# Patient Record
Sex: MALE | Race: WHITE | URBAN - METROPOLITAN AREA
[De-identification: names, ages, dates, MRNs, and addresses within clinical notes are randomized per-mention and may not be internally consistent; named-entity substitution may affect disease eponyms.]

---

## 2021-09-21 ENCOUNTER — HOSPITAL ENCOUNTER (OUTPATIENT)
Dept: GENERAL RADIOLOGY | Age: 21
Discharge: HOME | End: 2021-09-23
Payer: COMMERCIAL

## 2021-09-21 ENCOUNTER — HOSPITAL ENCOUNTER (OUTPATIENT)
Age: 21
Discharge: HOME | End: 2021-09-23
Payer: COMMERCIAL

## 2021-09-21 DIAGNOSIS — M79.671 RIGHT FOOT PAIN: ICD-10-CM

## 2021-09-21 PROCEDURE — 73630 X-RAY EXAM OF FOOT: CPT

## 2021-09-21 PROCEDURE — 73610 X-RAY EXAM OF ANKLE: CPT

## 2022-06-26 ENCOUNTER — HOSPITAL ENCOUNTER (INPATIENT)
Age: 22
LOS: 1 days | Discharge: HOME | DRG: 084 | End: 2022-06-27
Attending: EMERGENCY MEDICINE | Admitting: SURGERY

## 2022-06-26 ENCOUNTER — APPOINTMENT (OUTPATIENT)
Dept: CT IMAGING | Age: 22
DRG: 084 | End: 2022-06-26
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
## 2022-06-26 DIAGNOSIS — T14.90XA TRAUMA: ICD-10-CM

## 2022-06-26 LAB
ABO/RH: NORMAL
ANION GAP SERPL CALCULATED.3IONS-SCNC: 9 MMOL/L (ref 9–17)
ANTIBODY SCREEN: NEGATIVE
ARM BAND NUMBER: NORMAL
BLOOD BANK SPECIMEN: ABNORMAL
BUN BLDV-MCNC: 7 MG/DL (ref 6–20)
CARBOXYHEMOGLOBIN: 5.6 % (ref 0–5)
CHLORIDE BLD-SCNC: 103 MMOL/L (ref 98–107)
CO2: 24 MMOL/L (ref 20–31)
CREAT SERPL-MCNC: 0.89 MG/DL (ref 0.7–1.2)
ETHANOL PERCENT: <0.01 %
ETHANOL: <10 MG/DL
EXPIRATION DATE: NORMAL
FIO2: ABNORMAL
GLUCOSE BLD-MCNC: 156 MG/DL (ref 70–99)
HCG QUALITATIVE: ABNORMAL
HCO3 VENOUS: 24.8 MMOL/L (ref 24–30)
HCT VFR BLD CALC: 42.7 % (ref 40.7–50.3)
HEMOGLOBIN: 14.5 G/DL (ref 13–17)
INR BLD: 1
MCH RBC QN AUTO: 30.9 PG (ref 25.2–33.5)
MCHC RBC AUTO-ENTMCNC: 34 G/DL (ref 28.4–34.8)
MCV RBC AUTO: 90.9 FL (ref 82.6–102.9)
NEGATIVE BASE EXCESS, VEN: 0.4 MMOL/L (ref 0–2)
NRBC AUTOMATED: 0 PER 100 WBC
O2 SAT, VEN: 76.8 % (ref 60–85)
PARTIAL THROMBOPLASTIN TIME: 22.3 SEC (ref 20.5–30.5)
PATIENT TEMP: 37
PCO2, VEN: 44.6 (ref 39–55)
PDW BLD-RTO: 13.6 % (ref 11.8–14.4)
PH VENOUS: 7.36 (ref 7.32–7.42)
PLATELET # BLD: 172 K/UL (ref 138–453)
PMV BLD AUTO: 10.9 FL (ref 8.1–13.5)
PO2, VEN: 39.2 (ref 30–50)
POTASSIUM SERPL-SCNC: 3.2 MMOL/L (ref 3.7–5.3)
PROTHROMBIN TIME: 10.9 SEC (ref 9.1–12.3)
RBC # BLD: 4.7 M/UL (ref 4.21–5.77)
SODIUM BLD-SCNC: 136 MMOL/L (ref 135–144)
WBC # BLD: 10.9 K/UL (ref 3.5–11.3)

## 2022-06-26 PROCEDURE — 84703 CHORIONIC GONADOTROPIN ASSAY: CPT

## 2022-06-26 PROCEDURE — 82947 ASSAY GLUCOSE BLOOD QUANT: CPT

## 2022-06-26 PROCEDURE — 86900 BLOOD TYPING SEROLOGIC ABO: CPT

## 2022-06-26 PROCEDURE — 72125 CT NECK SPINE W/O DYE: CPT

## 2022-06-26 PROCEDURE — 6360000004 HC RX CONTRAST MEDICATION: Performed by: STUDENT IN AN ORGANIZED HEALTH CARE EDUCATION/TRAINING PROGRAM

## 2022-06-26 PROCEDURE — 82565 ASSAY OF CREATININE: CPT

## 2022-06-26 PROCEDURE — 86901 BLOOD TYPING SEROLOGIC RH(D): CPT

## 2022-06-26 PROCEDURE — 80051 ELECTROLYTE PANEL: CPT

## 2022-06-26 PROCEDURE — 99285 EMERGENCY DEPT VISIT HI MDM: CPT

## 2022-06-26 PROCEDURE — 3209999900 CT LUMBAR SPINE TRAUMA RECONSTRUCTION

## 2022-06-26 PROCEDURE — 71260 CT THORAX DX C+: CPT

## 2022-06-26 PROCEDURE — 85027 COMPLETE CBC AUTOMATED: CPT

## 2022-06-26 PROCEDURE — 3209999900 CT THORACIC SPINE TRAUMA RECONSTRUCTION

## 2022-06-26 PROCEDURE — G0480 DRUG TEST DEF 1-7 CLASSES: HCPCS

## 2022-06-26 PROCEDURE — 85730 THROMBOPLASTIN TIME PARTIAL: CPT

## 2022-06-26 PROCEDURE — 2060000002 HC BURN ICU INTERMEDIATE R&B

## 2022-06-26 PROCEDURE — 70450 CT HEAD/BRAIN W/O DYE: CPT

## 2022-06-26 PROCEDURE — 93005 ELECTROCARDIOGRAM TRACING: CPT

## 2022-06-26 PROCEDURE — 86850 RBC ANTIBODY SCREEN: CPT

## 2022-06-26 PROCEDURE — 85610 PROTHROMBIN TIME: CPT

## 2022-06-26 PROCEDURE — 84520 ASSAY OF UREA NITROGEN: CPT

## 2022-06-26 PROCEDURE — 82805 BLOOD GASES W/O2 SATURATION: CPT

## 2022-06-26 PROCEDURE — 70486 CT MAXILLOFACIAL W/O DYE: CPT

## 2022-06-26 RX ORDER — FENTANYL CITRATE 50 UG/ML
INJECTION, SOLUTION INTRAMUSCULAR; INTRAVENOUS
Status: DISPENSED
Start: 2022-06-26 | End: 2022-06-27

## 2022-06-26 RX ADMIN — IOPAMIDOL 130 ML: 755 INJECTION, SOLUTION INTRAVENOUS at 18:22
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
## 2022-06-27 VITALS
TEMPERATURE: 98.6 F | OXYGEN SATURATION: 96 % | HEART RATE: 52 BPM | BODY MASS INDEX: 17.82 KG/M2 | RESPIRATION RATE: 21 BRPM | HEIGHT: 75 IN | DIASTOLIC BLOOD PRESSURE: 72 MMHG | WEIGHT: 143.3 LBS | SYSTOLIC BLOOD PRESSURE: 112 MMHG

## 2022-06-27 PROCEDURE — 2580000003 HC RX 258: Performed by: PEDIATRICS

## 2022-06-27 PROCEDURE — 92523 SPEECH SOUND LANG COMPREHEN: CPT

## 2022-06-27 PROCEDURE — 6370000000 HC RX 637 (ALT 250 FOR IP): Performed by: PEDIATRICS

## 2022-06-27 RX ORDER — ONDANSETRON 4 MG/1
4 TABLET, ORALLY DISINTEGRATING ORAL EVERY 8 HOURS PRN
Status: DISCONTINUED | OUTPATIENT
Start: 2022-06-27 | End: 2022-06-27 | Stop reason: HOSPADM

## 2022-06-27 RX ORDER — SODIUM CHLORIDE 0.9 % (FLUSH) 0.9 %
5-40 SYRINGE (ML) INJECTION EVERY 12 HOURS SCHEDULED
Status: DISCONTINUED | OUTPATIENT
Start: 2022-06-27 | End: 2022-06-27 | Stop reason: HOSPADM

## 2022-06-27 RX ORDER — SODIUM CHLORIDE 0.9 % (FLUSH) 0.9 %
5-40 SYRINGE (ML) INJECTION PRN
Status: DISCONTINUED | OUTPATIENT
Start: 2022-06-27 | End: 2022-06-27 | Stop reason: HOSPADM

## 2022-06-27 RX ORDER — ACETAMINOPHEN 325 MG/1
650 TABLET ORAL EVERY 6 HOURS
Status: DISCONTINUED | OUTPATIENT
Start: 2022-06-27 | End: 2022-06-27 | Stop reason: HOSPADM

## 2022-06-27 RX ORDER — BISACODYL 10 MG
10 SUPPOSITORY, RECTAL RECTAL DAILY
Status: DISCONTINUED | OUTPATIENT
Start: 2022-06-27 | End: 2022-06-27 | Stop reason: HOSPADM

## 2022-06-27 RX ORDER — ONDANSETRON 2 MG/ML
4 INJECTION INTRAMUSCULAR; INTRAVENOUS EVERY 6 HOURS PRN
Status: DISCONTINUED | OUTPATIENT
Start: 2022-06-27 | End: 2022-06-27 | Stop reason: HOSPADM

## 2022-06-27 RX ORDER — SENNA PLUS 8.6 MG/1
1 TABLET ORAL NIGHTLY
Status: DISCONTINUED | OUTPATIENT
Start: 2022-06-27 | End: 2022-06-27 | Stop reason: HOSPADM

## 2022-06-27 RX ORDER — SODIUM PHOSPHATE, DIBASIC AND SODIUM PHOSPHATE, MONOBASIC 7; 19 G/133ML; G/133ML
1 ENEMA RECTAL DAILY PRN
Status: DISCONTINUED | OUTPATIENT
Start: 2022-06-27 | End: 2022-06-27

## 2022-06-27 RX ORDER — POTASSIUM CHLORIDE 750 MG/1
40 CAPSULE, EXTENDED RELEASE ORAL ONCE
Status: COMPLETED | OUTPATIENT
Start: 2022-06-27 | End: 2022-06-27

## 2022-06-27 RX ORDER — SODIUM CHLORIDE 9 MG/ML
INJECTION, SOLUTION INTRAVENOUS PRN
Status: DISCONTINUED | OUTPATIENT
Start: 2022-06-27 | End: 2022-06-27 | Stop reason: HOSPADM

## 2022-06-27 RX ORDER — POLYETHYLENE GLYCOL 3350 17 G/17G
17 POWDER, FOR SOLUTION ORAL DAILY
Status: DISCONTINUED | OUTPATIENT
Start: 2022-06-27 | End: 2022-06-27 | Stop reason: HOSPADM

## 2022-06-27 RX ADMIN — SODIUM CHLORIDE, PRESERVATIVE FREE 10 ML: 5 INJECTION INTRAVENOUS at 09:21

## 2022-06-27 RX ADMIN — SODIUM CHLORIDE, PRESERVATIVE FREE 10 ML: 5 INJECTION INTRAVENOUS at 01:17

## 2022-06-27 RX ADMIN — ACETAMINOPHEN 650 MG: 325 TABLET ORAL at 09:21

## 2022-06-27 RX ADMIN — POTASSIUM CHLORIDE 40 MEQ: 10 CAPSULE, COATED, EXTENDED RELEASE ORAL at 03:52

## 2022-06-27 RX ADMIN — ACETAMINOPHEN 650 MG: 325 TABLET ORAL at 01:28

## 2022-06-27 ASSESSMENT — PAIN DESCRIPTION - LOCATION
LOCATION: FACE
LOCATION: FACE;MOUTH

## 2022-06-27 ASSESSMENT — PAIN - FUNCTIONAL ASSESSMENT: PAIN_FUNCTIONAL_ASSESSMENT: ACTIVITIES ARE NOT PREVENTED

## 2022-06-27 ASSESSMENT — PAIN DESCRIPTION - DESCRIPTORS
DESCRIPTORS: ACHING;DISCOMFORT
DESCRIPTORS: ACHING

## 2022-06-27 ASSESSMENT — PAIN DESCRIPTION - ONSET
ONSET: ON-GOING
ONSET: ON-GOING

## 2022-06-27 ASSESSMENT — PAIN DESCRIPTION - ORIENTATION
ORIENTATION: LEFT
ORIENTATION: LEFT

## 2022-06-27 ASSESSMENT — PAIN SCALES - GENERAL
PAINLEVEL_OUTOF10: 7
PAINLEVEL_OUTOF10: 5
PAINLEVEL_OUTOF10: 4
PAINLEVEL_OUTOF10: 6
PAINLEVEL_OUTOF10: 6
PAINLEVEL_OUTOF10: 3

## 2022-06-27 ASSESSMENT — LIFESTYLE VARIABLES
HOW MANY STANDARD DRINKS CONTAINING ALCOHOL DO YOU HAVE ON A TYPICAL DAY: 3 OR 4
HOW OFTEN DO YOU HAVE A DRINK CONTAINING ALCOHOL: 2-4 TIMES A MONTH

## 2022-06-27 ASSESSMENT — PAIN DESCRIPTION - PAIN TYPE
TYPE: ACUTE PAIN
TYPE: ACUTE PAIN

## 2022-06-27 ASSESSMENT — PAIN DESCRIPTION - FREQUENCY
FREQUENCY: INTERMITTENT
FREQUENCY: INTERMITTENT
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
## 2022-06-27 NOTE — PLAN OF CARE
Problem: Discharge Planning  Goal: Discharge to home or other facility with appropriate resources  Outcome: Progressing  Flowsheets (Taken 6/27/2022 0057)  Discharge to home or other facility with appropriate resources:   Identify discharge learning needs (meds, wound care, etc)   Refer to discharge planning if patient needs post-hospital services based on physician order or complex needs related to functional status, cognitive ability or social support system   Arrange for needed discharge resources and transportation as appropriate     Problem: Pain  Goal: Verbalizes/displays adequate comfort level or baseline comfort level  Outcome: Progressing     Problem: Safety - Adult  Goal: Free from fall injury  Outcome: Progressing
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
## 2022-06-28 LAB
EKG ATRIAL RATE: 69 BPM
EKG P AXIS: 68 DEGREES
EKG P-R INTERVAL: 126 MS
EKG Q-T INTERVAL: 346 MS
EKG QRS DURATION: 88 MS
EKG QTC CALCULATION (BAZETT): 370 MS
EKG R AXIS: 66 DEGREES
EKG T AXIS: 50 DEGREES
EKG VENTRICULAR RATE: 69 BPM

## 2022-06-28 PROCEDURE — 93010 ELECTROCARDIOGRAM REPORT: CPT | Performed by: INTERNAL MEDICINE
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
